# Patient Record
Sex: FEMALE | Race: BLACK OR AFRICAN AMERICAN | NOT HISPANIC OR LATINO | RURAL
[De-identification: names, ages, dates, MRNs, and addresses within clinical notes are randomized per-mention and may not be internally consistent; named-entity substitution may affect disease eponyms.]

---

## 2023-06-20 DIAGNOSIS — L20.9 ATOPIC DERMATITIS: Primary | ICD-10-CM

## 2023-08-02 ENCOUNTER — OFFICE VISIT (OUTPATIENT)
Dept: DERMATOLOGY | Facility: CLINIC | Age: 53
End: 2023-08-02
Payer: MEDICAID

## 2023-08-02 DIAGNOSIS — L30.9 DERMATITIS, UNSPECIFIED: ICD-10-CM

## 2023-08-02 LAB
BACTERIA HYPHAE, POC: NEGATIVE
YEAST, POC: NEGATIVE

## 2023-08-02 PROCEDURE — 1160F PR REVIEW ALL MEDS BY PRESCRIBER/CLIN PHARMACIST DOCUMENTED: ICD-10-PCS | Mod: CPTII,,, | Performed by: DERMATOLOGY

## 2023-08-02 PROCEDURE — 87220 PR  TISSUE EXAM BY KOH: ICD-10-PCS | Mod: ,,, | Performed by: DERMATOLOGY

## 2023-08-02 PROCEDURE — 4010F ACE/ARB THERAPY RXD/TAKEN: CPT | Mod: CPTII,,, | Performed by: DERMATOLOGY

## 2023-08-02 PROCEDURE — 4010F PR ACE/ARB THEARPY RXD/TAKEN: ICD-10-PCS | Mod: CPTII,,, | Performed by: DERMATOLOGY

## 2023-08-02 PROCEDURE — 1159F MED LIST DOCD IN RCRD: CPT | Mod: CPTII,,, | Performed by: DERMATOLOGY

## 2023-08-02 PROCEDURE — 1160F RVW MEDS BY RX/DR IN RCRD: CPT | Mod: CPTII,,, | Performed by: DERMATOLOGY

## 2023-08-02 PROCEDURE — 99204 PR OFFICE/OUTPT VISIT, NEW, LEVL IV, 45-59 MIN: ICD-10-PCS | Mod: 25,,, | Performed by: DERMATOLOGY

## 2023-08-02 PROCEDURE — 99204 OFFICE O/P NEW MOD 45 MIN: CPT | Mod: 25,,, | Performed by: DERMATOLOGY

## 2023-08-02 PROCEDURE — 87220 TISSUE EXAM FOR FUNGI: CPT | Mod: ,,, | Performed by: DERMATOLOGY

## 2023-08-02 PROCEDURE — 1159F PR MEDICATION LIST DOCUMENTED IN MEDICAL RECORD: ICD-10-PCS | Mod: CPTII,,, | Performed by: DERMATOLOGY

## 2023-08-02 RX ORDER — HYDROXYZINE HYDROCHLORIDE 25 MG/1
TABLET, FILM COATED ORAL
COMMUNITY
Start: 2023-07-10

## 2023-08-02 RX ORDER — CLOBETASOL PROPIONATE 0.5 MG/G
CREAM TOPICAL
Qty: 60 G | Refills: 2 | Status: SHIPPED | OUTPATIENT
Start: 2023-08-02 | End: 2023-09-13 | Stop reason: SDUPTHER

## 2023-08-02 RX ORDER — LISINOPRIL AND HYDROCHLOROTHIAZIDE 12.5; 2 MG/1; MG/1
1 TABLET ORAL 2 TIMES DAILY
COMMUNITY
Start: 2023-05-30

## 2023-08-02 RX ORDER — GABAPENTIN 300 MG/1
CAPSULE ORAL
COMMUNITY
Start: 2023-07-16

## 2023-08-02 NOTE — PROGRESS NOTES
[facility]   Nikki Goff MD    Patient Name: Jessica Kim  Patient YOB: 1970   Date of Service: 8/2/23    CC: Rash    HPI: Jessica Kim is a 53 y.o. female here today for rash, located on the bilateral hands.  Rash has been present for 2 months.  Previous treatments include hydroxyzine, prednisone, nystatin, and TAC.  Patient is also concerned today about a rash on the sacrum that has been present for 3 months.    History reviewed. No pertinent past medical history.  History reviewed. No pertinent surgical history.  Review of patient's allergies indicates:   Allergen Reactions    Penicillins        Current Outpatient Medications:     clobetasoL (TEMOVATE) 0.05 % cream, Apply to AA on body BID PRN flares tapering with improvement, Disp: 60 g, Rfl: 2    gabapentin (NEURONTIN) 300 MG capsule, Take by mouth., Disp: , Rfl:     hydrOXYzine HCL (ATARAX) 25 MG tablet, Take by mouth., Disp: , Rfl:     lisinopriL-hydrochlorothiazide (PRINZIDE,ZESTORETIC) 20-12.5 mg per tablet, Take 1 tablet by mouth 2 (two) times daily., Disp: , Rfl:     ROS: A focused review of systems was obtained and negative.     Exam: A focused skin exam was performed. All areas examined were normal except as mentioned in the assessment and plan below.  General Appearance of the patient is well developed and well nourished.  Orientation: alert and oriented x 3.  Mood and affect: pleasant.    Assessment:   The encounter diagnosis was Dermatitis, unspecified.    Plan:   Medications Ordered This Encounter   Medications    clobetasoL (TEMOVATE) 0.05 % cream     Sig: Apply to AA on body BID PRN flares tapering with improvement     Dispense:  60 g     Refill:  2       Dermatitis Unspecified  - thick eczematous plaques with fissures in a  distribution on the palms  - eczematous plaques with superficial scale on the buttock  DDx: ACD vs eczema    Plan: Counseling  I counseled the patient regarding the following:  Skin care:  Patient instructed to use gentle skin care including dove unscented soap, CeraVe moisturizing cream, and fragrance free laundry detergent.  Expectations: The patient understands that there is not a definitive diagnosis at this time. Further testing or empiric therapy may be necessary to diagnose and improve the condition.  Contact office if: The patient develops a fever, or rash dramatically worsens despite treatment.    Plan: KOH Prep  Location: buttocks  A KOH prep was ordered and evaluated from the above location. A 15-blade scalpel was used to scrape the skin. The skin scrapings were placed on a glass slide, covered with a coverslip and a KOH solution was applied. Examination of the slide showed: negative.    - Will refer to Dr. Cochran for patch testing  - Will send in Clobetasol  - recommended avoiding walker handles as she thinks this could be driving the rash     Follow up in about 6 weeks (around 9/13/2023) for FU.    Nikki Goff MD

## 2023-09-13 ENCOUNTER — OFFICE VISIT (OUTPATIENT)
Dept: DERMATOLOGY | Facility: CLINIC | Age: 53
End: 2023-09-13
Payer: MEDICAID

## 2023-09-13 DIAGNOSIS — L30.9 DERMATITIS, UNSPECIFIED: ICD-10-CM

## 2023-09-13 PROCEDURE — 1160F RVW MEDS BY RX/DR IN RCRD: CPT | Mod: CPTII,,, | Performed by: DERMATOLOGY

## 2023-09-13 PROCEDURE — 1159F PR MEDICATION LIST DOCUMENTED IN MEDICAL RECORD: ICD-10-PCS | Mod: CPTII,,, | Performed by: DERMATOLOGY

## 2023-09-13 PROCEDURE — 4010F ACE/ARB THERAPY RXD/TAKEN: CPT | Mod: CPTII,,, | Performed by: DERMATOLOGY

## 2023-09-13 PROCEDURE — 1160F PR REVIEW ALL MEDS BY PRESCRIBER/CLIN PHARMACIST DOCUMENTED: ICD-10-PCS | Mod: CPTII,,, | Performed by: DERMATOLOGY

## 2023-09-13 PROCEDURE — 99214 OFFICE O/P EST MOD 30 MIN: CPT | Mod: ,,, | Performed by: DERMATOLOGY

## 2023-09-13 PROCEDURE — 1159F MED LIST DOCD IN RCRD: CPT | Mod: CPTII,,, | Performed by: DERMATOLOGY

## 2023-09-13 PROCEDURE — 4010F PR ACE/ARB THEARPY RXD/TAKEN: ICD-10-PCS | Mod: CPTII,,, | Performed by: DERMATOLOGY

## 2023-09-13 PROCEDURE — 99214 PR OFFICE/OUTPT VISIT, EST, LEVL IV, 30-39 MIN: ICD-10-PCS | Mod: ,,, | Performed by: DERMATOLOGY

## 2023-09-13 RX ORDER — CLOBETASOL PROPIONATE 0.5 MG/G
CREAM TOPICAL
Qty: 60 G | Refills: 2 | Status: SHIPPED | OUTPATIENT
Start: 2023-09-13

## 2023-09-13 NOTE — PROGRESS NOTES
[facility]   Nikki Goff MD    Patient Name: Jessica Kim  Patient YOB: 1970   Date of Service: 9/13/23    CC: Follow-up Derm, unspec.    HPI: Jessica Kim is a 53 y.o. female here today for follow-up of derm, unspec, last seen 08/2023.  Previous treatments include clobetasol.  Overall, the derm, unspec. is stable.  Treatment plan was followed as directed.    History reviewed. No pertinent past medical history.  History reviewed. No pertinent surgical history.  Review of patient's allergies indicates:   Allergen Reactions    Penicillins        Current Outpatient Medications:     gabapentin (NEURONTIN) 300 MG capsule, Take by mouth., Disp: , Rfl:     hydrOXYzine HCL (ATARAX) 25 MG tablet, Take by mouth., Disp: , Rfl:     lisinopriL-hydrochlorothiazide (PRINZIDE,ZESTORETIC) 20-12.5 mg per tablet, Take 1 tablet by mouth 2 (two) times daily., Disp: , Rfl:     clobetasoL (TEMOVATE) 0.05 % cream, Apply to AA on body BID PRN flares tapering with improvement, Disp: 60 g, Rfl: 2    ROS: A focused review of systems was obtained and negative.     Exam: A focused skin exam was performed. All areas examined were normal except as mentioned in the assessment and plan below.  General Appearance of the patient is well developed and well nourished.  Orientation: alert and oriented x 3.  Mood and affect: pleasant.    Assessment:   The encounter diagnosis was Dermatitis, unspecified.    Plan:   Medications Ordered This Encounter   Medications    clobetasoL (TEMOVATE) 0.05 % cream     Sig: Apply to AA on body BID PRN flares tapering with improvement     Dispense:  60 g     Refill:  2     Dermatitis Unspecified  - thick eczematous plaques with fissures in a  distribution on the palms  - eczematous plaques with superficial scale on the buttock  DDx: ACD vs eczema  Status: Inadequately controlled     Plan: Counseling  I counseled the patient regarding the following:  Skin care: Patient instructed to use  Continue Regimen: Fluocinonide ointment applied BID PRN flares gentle skin care including dove unscented soap, CeraVe moisturizing cream, and fragrance free laundry detergent.  Expectations: The patient understands that there is not a definitive diagnosis at this time. Further testing or empiric therapy may be necessary to diagnose and improve the condition.  Contact office if: The patient develops a fever, or rash dramatically worsens despite treatment.    - still flaring despite clobetasol  - pt has not avoided wheelchair or walker handles.  Encouraged pt to wear gloves  - scheduled with Dr. Cochran on 10/9.  Will follow up after patch testing   - continue clobetasol    Follow up if symptoms worsen or fail to improve.    Nikki Goff MD   Detail Level: Zone Initiate Treatment: Tacrolimus 0.1% ointment applied to the AA BID Render In Strict Bullet Format?: No

## 2024-10-02 ENCOUNTER — OFFICE VISIT (OUTPATIENT)
Dept: OBSTETRICS AND GYNECOLOGY | Facility: CLINIC | Age: 54
End: 2024-10-02
Payer: MEDICAID

## 2024-10-02 VITALS — SYSTOLIC BLOOD PRESSURE: 104 MMHG | DIASTOLIC BLOOD PRESSURE: 62 MMHG | HEART RATE: 78 BPM | WEIGHT: 110 LBS

## 2024-10-02 DIAGNOSIS — R10.9 ABDOMINAL PAIN, UNSPECIFIED ABDOMINAL LOCATION: ICD-10-CM

## 2024-10-02 DIAGNOSIS — Z12.11 ENCOUNTER FOR SCREENING COLONOSCOPY: ICD-10-CM

## 2024-10-02 DIAGNOSIS — R10.13 EPIGASTRIC PAIN: Primary | ICD-10-CM

## 2024-10-02 PROCEDURE — 99204 OFFICE O/P NEW MOD 45 MIN: CPT | Mod: ,,, | Performed by: OBSTETRICS & GYNECOLOGY

## 2024-10-02 PROCEDURE — 3074F SYST BP LT 130 MM HG: CPT | Mod: CPTII,,, | Performed by: OBSTETRICS & GYNECOLOGY

## 2024-10-02 PROCEDURE — 1159F MED LIST DOCD IN RCRD: CPT | Mod: CPTII,,, | Performed by: OBSTETRICS & GYNECOLOGY

## 2024-10-02 PROCEDURE — 3078F DIAST BP <80 MM HG: CPT | Mod: CPTII,,, | Performed by: OBSTETRICS & GYNECOLOGY

## 2024-10-02 NOTE — PROGRESS NOTES
History & Physical    SUBJECTIVE:     History of Present Illness:  Patient is a 54 y.o. female presents with upper abdominal pain. Patient states she hurts after eating and feels like her food is going to come up. Onset of symptoms was gradual starting a few months ago with unchanged course since that time. Patient states she has been seeing .    Chief Complaint   Patient presents with    Abdominal Pain       Review of patient's allergies indicates:   Allergen Reactions    Penicillins        Current Outpatient Medications   Medication Sig Dispense Refill    clobetasoL (TEMOVATE) 0.05 % cream Apply to AA on body BID PRN flares tapering with improvement 60 g 2    gabapentin (NEURONTIN) 300 MG capsule Take by mouth.      hydrOXYzine HCL (ATARAX) 25 MG tablet Take by mouth.      lisinopriL-hydrochlorothiazide (PRINZIDE,ZESTORETIC) 20-12.5 mg per tablet Take 1 tablet by mouth 2 (two) times daily.       No current facility-administered medications for this visit.       Past Medical History:   Diagnosis Date    Hypertension      Past Surgical History:   Procedure Laterality Date    LEG AMPUTATION Left      No family history on file.  Social History     Tobacco Use    Smoking status: Every Day     Types: Cigarettes     Passive exposure: Never    Smokeless tobacco: Never   Substance Use Topics    Alcohol use: Yes    Drug use: Never        Review of Systems:  Review of Systems   Constitutional:  Negative for appetite change, chills, fatigue and fever.   HENT: Negative.     Eyes: Negative.    Respiratory:  Negative for cough, chest tightness and shortness of breath.    Cardiovascular:  Negative for chest pain, palpitations and leg swelling.   Gastrointestinal:  Negative for abdominal distention, abdominal pain, blood in stool, constipation, diarrhea, nausea and vomiting.   Endocrine: Negative for cold intolerance, heat intolerance, polydipsia, polyphagia and polyuria.   Genitourinary:  Positive for pelvic pain.  Negative for difficulty urinating, dyspareunia, dysuria, flank pain, frequency, urgency, vaginal bleeding, vaginal discharge and vaginal pain.   Musculoskeletal: Negative.    Skin: Negative.    Neurological: Negative.    Psychiatric/Behavioral: Negative.  Negative for agitation, behavioral problems, confusion and sleep disturbance. The patient is not nervous/anxious.        OBJECTIVE:     Vital Signs (Most Recent)  Pulse: 78 (10/02/24 1428)  BP: 104/62 (10/02/24 1428)     49.9 kg (110 lb)     Physical Exam:  Physical Exam  Vitals reviewed. Exam conducted with a chaperone present.   Constitutional:       Appearance: Normal appearance.   HENT:      Head: Normocephalic and atraumatic.      Mouth/Throat:      Mouth: Mucous membranes are moist.   Eyes:      Extraocular Movements: Extraocular movements intact.      Pupils: Pupils are equal, round, and reactive to light.   Cardiovascular:      Rate and Rhythm: Normal rate and regular rhythm.      Pulses: Normal pulses.      Heart sounds: Normal heart sounds.   Pulmonary:      Effort: Pulmonary effort is normal.      Breath sounds: Normal breath sounds.   Abdominal:      General: Abdomen is flat. Bowel sounds are normal.      Palpations: Abdomen is soft.   Musculoskeletal:         General: Normal range of motion.      Cervical back: Normal range of motion.   Skin:     General: Skin is warm and dry.   Neurological:      General: No focal deficit present.      Mental Status: She is alert and oriented to person, place, and time.   Psychiatric:         Mood and Affect: Mood normal.         Behavior: Behavior normal.         Thought Content: Thought content normal.         Judgment: Judgment normal.             ASSESSMENT/PLAN:   Jessica was seen today for abdominal pain.    Diagnoses and all orders for this visit:    Epigastric pain  -     US Abdomen Limited; Future  -     H. pylori antigen, stool; Future  -     Colonoscopy; Future  -     Ambulatory referral/consult to Family  Practice; Future    Abdominal pain, unspecified abdominal location  -     US Pelvis Complete Non OB; Future    Encounter for screening colonoscopy  -     Colonoscopy; Future        PLAN:Plan

## 2024-10-09 ENCOUNTER — PROCEDURE VISIT (OUTPATIENT)
Dept: OBSTETRICS AND GYNECOLOGY | Facility: CLINIC | Age: 54
End: 2024-10-09
Payer: MEDICAID

## 2024-10-09 DIAGNOSIS — R10.2 PELVIC PAIN: ICD-10-CM

## 2024-10-09 DIAGNOSIS — R10.13 EPIGASTRIC PAIN: Primary | ICD-10-CM

## 2024-10-09 DIAGNOSIS — R10.9 ABDOMINAL PAIN, UNSPECIFIED ABDOMINAL LOCATION: ICD-10-CM

## 2024-10-09 PROCEDURE — 76856 US EXAM PELVIC COMPLETE: CPT | Mod: ,,, | Performed by: OBSTETRICS & GYNECOLOGY

## 2024-10-09 PROCEDURE — 99499 UNLISTED E&M SERVICE: CPT | Mod: ,,, | Performed by: OBSTETRICS & GYNECOLOGY

## 2024-10-23 ENCOUNTER — OFFICE VISIT (OUTPATIENT)
Dept: OBSTETRICS AND GYNECOLOGY | Facility: CLINIC | Age: 54
End: 2024-10-23
Payer: MEDICAID

## 2024-10-23 VITALS — HEART RATE: 86 BPM | SYSTOLIC BLOOD PRESSURE: 101 MMHG | WEIGHT: 105.63 LBS | DIASTOLIC BLOOD PRESSURE: 59 MMHG

## 2024-10-23 DIAGNOSIS — K80.20 CALCULUS OF GALLBLADDER WITHOUT CHOLECYSTITIS WITHOUT OBSTRUCTION: Primary | ICD-10-CM

## 2024-10-23 DIAGNOSIS — L73.9 FOLLICULITIS: ICD-10-CM

## 2024-10-23 PROCEDURE — 99214 OFFICE O/P EST MOD 30 MIN: CPT | Mod: ,,, | Performed by: OBSTETRICS & GYNECOLOGY

## 2024-10-23 PROCEDURE — 3078F DIAST BP <80 MM HG: CPT | Mod: CPTII,,, | Performed by: OBSTETRICS & GYNECOLOGY

## 2024-10-23 PROCEDURE — 3074F SYST BP LT 130 MM HG: CPT | Mod: CPTII,,, | Performed by: OBSTETRICS & GYNECOLOGY

## 2024-10-23 RX ORDER — SULFAMETHOXAZOLE AND TRIMETHOPRIM 800; 160 MG/1; MG/1
1 TABLET ORAL 2 TIMES DAILY
Qty: 14 TABLET | Refills: 0 | Status: SHIPPED | OUTPATIENT
Start: 2024-10-23 | End: 2024-10-30

## 2024-10-23 NOTE — PROGRESS NOTES
Subjective     Patient ID: Jessica Kim is a 54 y.o. female.    Chief Complaint:  Follow-up      History of Present Illness  HPI  Patient here to follow up from 10/02/2024. Reviewed US and labs with patient.  Patient also complains of bump on left inner thigh where her panty line is rubbing.     No visits with results within 2 Week(s) from this visit.   Latest known visit with results is:   Office Visit on 2023   Component Date Value Ref Range Status    Yeast, UA 2023 Negative  Negative Final    Bacteria Hyphae, POC 2023 Negative  Negative Final           GYN & OB History  No LMP recorded (lmp unknown). Patient is postmenopausal.   Date of Last Pap: No result found    OB History    Para Term  AB Living   0 0 0 0 0 0   SAB IAB Ectopic Multiple Live Births   0 0 0 0 0       Review of Systems  Review of Systems   Constitutional:  Negative for activity change, appetite change, fatigue and unexpected weight change.   HENT: Negative.     Respiratory:  Negative for cough, shortness of breath and wheezing.    Cardiovascular:  Negative for chest pain, palpitations and leg swelling.   Gastrointestinal:  Negative for abdominal pain, bloating, blood in stool, constipation, diarrhea, nausea, vomiting and reflux.   Genitourinary:  Negative for bladder incontinence, decreased libido, dysmenorrhea, dyspareunia, dysuria, flank pain, frequency, menorrhagia, menstrual problem, pelvic pain, urgency, vaginal bleeding, vaginal discharge, postcoital bleeding and vaginal odor.   Musculoskeletal:  Negative for back pain.   Integumentary:  Negative for rash, acne, hair changes, mole/lesion, breast mass, nipple discharge, breast skin changes and breast tenderness.   Neurological:  Negative for headaches.   Psychiatric/Behavioral:  Negative for depression and sleep disturbance. The patient is not nervous/anxious.    Breast: Negative for asymmetry, breast self exam, lump, mass, nipple discharge, skin changes  and tenderness         Objective   Physical Exam:   Constitutional: She is oriented to person, place, and time. She appears well-developed and well-nourished.    HENT:   Head: Normocephalic and atraumatic.    Eyes: Pupils are equal, round, and reactive to light. EOM are normal.     Cardiovascular:  Normal rate, regular rhythm and normal heart sounds.             Pulmonary/Chest: Effort normal and breath sounds normal.        Abdominal: Soft. Bowel sounds are normal.     Genitourinary:                 Musculoskeletal: Normal range of motion and moves all extremeties.       Neurological: She is alert and oriented to person, place, and time. She has normal reflexes.     Psychiatric: She has a normal mood and affect. Her behavior is normal. Judgment and thought content normal.            Assessment and Plan     1. Calculus of gallbladder without cholecystitis without obstruction    2. Folliculitis             Plan:  Antibiotics written  Refer to General surgery for gallstones.

## 2024-10-24 NOTE — PROCEDURES
GYN Ultrasound Note:    Uterus 4.35 x 2.04 x 2.25 cm  Endometrial thickness 0.982 cm    Right ovary 2.87 x 1.5 x 1.65 cm  Left ovary 3.20 x 2.14 x 1.73 cm    Impression:  Right adnexal fluid seen 2.1 cm     Limited gallbladder ultrasound shows hypoechoic areas seen within the gallbladder measuring 0.9 cm with thickening of the gallbladder at 0.34 cm

## 2024-11-04 ENCOUNTER — OFFICE VISIT (OUTPATIENT)
Dept: SURGERY | Facility: CLINIC | Age: 54
End: 2024-11-04
Attending: SURGERY
Payer: MEDICAID

## 2024-11-04 DIAGNOSIS — K80.50 BILIARY COLIC: ICD-10-CM

## 2024-11-04 DIAGNOSIS — K80.20 CALCULUS OF GALLBLADDER WITHOUT CHOLECYSTITIS WITHOUT OBSTRUCTION: Primary | ICD-10-CM

## 2024-11-04 PROCEDURE — 99213 OFFICE O/P EST LOW 20 MIN: CPT | Mod: PBBFAC | Performed by: SURGERY

## 2024-11-04 PROCEDURE — 99999 PR PBB SHADOW E&M-EST. PATIENT-LVL III: CPT | Mod: PBBFAC,,, | Performed by: SURGERY

## 2024-11-04 PROCEDURE — 1159F MED LIST DOCD IN RCRD: CPT | Mod: CPTII,,, | Performed by: SURGERY

## 2024-11-04 PROCEDURE — 99204 OFFICE O/P NEW MOD 45 MIN: CPT | Mod: S$PBB,,, | Performed by: SURGERY

## 2024-11-04 NOTE — ASSESSMENT & PLAN NOTE
Ultrasound performed in Dr. Bonds's office suggest possible gallstone.    We will obtain an official ultrasound from [facility] followed by liver function tests, CBC, a return visit to see me.

## 2024-11-04 NOTE — PROGRESS NOTES
[facility] HOSPITALIST PROGRESS NOTE     PATIENT NAME: Jessica Kim  MRN: [id number]  TODAY'S DATE: 11/4/2024  HOSPITAL DAY: 0    CC: ***    BRIEF HPI: ***    INTERVAL HISTORY:  ***    ROS: No CP, SOB, palpitations, nausea, or vomiting.     MEDICATIONS:      PHYSICAL EXAM:  VS: There were no vitals filed for this visit. Constitutional: adult ***; nondistressed  Eyes: anicteric; no corneal arcus  E,N,M,T: NC/AT; moist mucous membranes; *** dentition  Resp: nonlabored on ***; no wheezing or rales   CV: normal rate, regular rhythm; no murmur or gallop; JVP *** cm H2O; no carotid bruit; *** lower extremity edema; distal extremities ***   GI: nondistended; soft, nontender; no ventral hernia     MSK: *** temporal wasting; ***  strength; no hot or tender joints   Neuro: CN II-XII grossly intact; light touch sensation intact  Lymphatic: no cervical lymphadenopathy; no supraclavicular lymphadenopathy   Skin: no visible rash or breakdown; no palpable nodule  Psych: fully oriented; normal mood and congruent affect     DATA REVIEW:  Labs: reviewed     Imaging: reviewed and agree with radiology interpretation.      ASSESSMENT/PLAN:    Problem List:  There are no relevant problems documented for this patient.       No problem-specific Assessment & Plan notes found for this encounter.     ***    DVT Ppx:   Anticoagulants     None          ALEJANDRA Way MD  General Surgery
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
## 2024-11-13 ENCOUNTER — HOSPITAL ENCOUNTER (OUTPATIENT)
Dept: RADIOLOGY | Facility: HOSPITAL | Age: 54
Discharge: HOME | End: 2024-11-13
Attending: SURGERY
Payer: MEDICAID

## 2024-11-13 DIAGNOSIS — K80.20 CALCULUS OF GALLBLADDER WITHOUT CHOLECYSTITIS WITHOUT OBSTRUCTION: ICD-10-CM

## 2024-11-13 PROCEDURE — 76700 US EXAM ABDOM COMPLETE: CPT | Mod: TC

## 2024-11-13 PROCEDURE — 76700 US EXAM ABDOM COMPLETE: CPT | Mod: 26,,, | Performed by: RADIOLOGY
